# Patient Record
Sex: MALE | Race: WHITE | NOT HISPANIC OR LATINO | URBAN - METROPOLITAN AREA
[De-identification: names, ages, dates, MRNs, and addresses within clinical notes are randomized per-mention and may not be internally consistent; named-entity substitution may affect disease eponyms.]

---

## 2017-06-05 ENCOUNTER — IMPORTED ENCOUNTER (OUTPATIENT)
Dept: URBAN - METROPOLITAN AREA CLINIC 31 | Facility: CLINIC | Age: 72
End: 2017-06-05

## 2017-06-05 PROBLEM — Z96.1: Noted: 2017-06-05

## 2017-06-05 PROBLEM — H25.12: Noted: 2017-06-05

## 2017-06-05 PROBLEM — H43.811: Noted: 2017-06-05

## 2017-06-05 PROCEDURE — 92014 COMPRE OPH EXAM EST PT 1/>: CPT

## 2017-06-05 NOTE — PATIENT DISCUSSION
1.  Pseudophakia OD - IOL stable. Monitor. 2. Nuclear Sclerotic Cataract OS: Explained how cataracts can effect vision. Recommend clinical observation. The patient was advised to contact us if any change or worsening of vision. 3. PVD OD: Patient was cautioned to call our office immediately if they experience a substantial change in their symptoms such as an increase in floaters persistent flashes loss of visual field (may appear as a shadow or a curtain) or decrease in visual acuity as these may indicate a retinal tear or detachment. If this is a new problem patient will need to return for re-examination  as determined by the [facility] [place] [id number]. Return for an appointment in 1 year for comprehensive exam. with Dr. Dara Coy.

## 2018-06-04 ENCOUNTER — IMPORTED ENCOUNTER (OUTPATIENT)
Dept: URBAN - METROPOLITAN AREA CLINIC 31 | Facility: CLINIC | Age: 73
End: 2018-06-04

## 2018-06-04 PROBLEM — H26.491: Noted: 2018-06-04

## 2018-06-04 PROBLEM — H25.12: Noted: 2018-06-04

## 2018-06-04 PROBLEM — H43.811: Noted: 2018-06-04

## 2018-06-04 PROBLEM — Z96.1: Noted: 2018-06-04

## 2018-06-04 PROCEDURE — 92014 COMPRE OPH EXAM EST PT 1/>: CPT

## 2018-06-04 NOTE — PATIENT DISCUSSION
Nuclear Sclerotic Cataract OS: Discussed the risks benefits alternatives and limitations of cataract surgery including infection bleeding loss of vision retinal tears detachment. The patient stated a full understanding and a desire to proceed with the procedure in the left eye. Refractive options were reviewed. Patient has elected to be optimized for distance vision in the left eye. The patient will still need glasses for reading and to possibly fine tune distance vision.

## 2018-08-15 ENCOUNTER — IMPORTED ENCOUNTER (OUTPATIENT)
Dept: URBAN - METROPOLITAN AREA CLINIC 31 | Facility: CLINIC | Age: 73
End: 2018-08-15

## 2018-08-15 PROBLEM — H25.12: Noted: 2018-08-15

## 2018-08-15 PROBLEM — Z96.1: Noted: 2018-08-15

## 2018-08-15 PROBLEM — H26.491: Noted: 2018-08-15

## 2018-08-15 PROCEDURE — 92015 DETERMINE REFRACTIVE STATE: CPT

## 2018-08-15 PROCEDURE — 99214 OFFICE O/P EST MOD 30 MIN: CPT

## 2018-08-15 NOTE — PATIENT DISCUSSION
1.  PCO  OD (Posterior Capsule Opacification)   PCO is visually significant and impairment of vision does not meet the patient’s functional needs or interferes with activities of daily living. Risks benefits and alternatives to the Nd:YAG Laser reviewed including elevated IOP immediately postop and retinal tear/detachment. Patient to notify their ophthalmologist promptly if they have a significant change in symptoms such as flashes of light (photopsia) an increase in floaters loss of visual field or decrease in visual acuity after the procedure. Patient will be scheduled in [place] [address] for Nd:YAG Laser. 2. Cataract OS: Discussed the risks benefits alternatives and limitations of cataract surgery including infection bleeding loss of vision retinal tears detachment. The patient stated a full understanding and a desire to proceed with the surgery in the left eye. Refractive options were reviewed. Patient has elected to have monovision and has tried monovision in the past.  The patient will have ORA guidance to confirm lens power choice. There is no guarantee of perfect uncorrected acuity and the possible need for enhancement was discussed. The patient may need glasses for night driving and to fine tune the vision. Pore plano3. Pseudophakia OD - IOL stable. Monitor. 4. Return for an appointment for [address] [place] [address] with Dr. Gwendolyn Munoz.

## 2018-08-15 NOTE — PATIENT DISCUSSION
Cataract OS: Discussed the risks benefits alternatives and limitations of cataract surgery including infection bleeding loss of vision retinal tears detachment. The patient stated a full understanding and a desire to proceed with the surgery in the left eye. Refractive options were reviewed. Patient has elected to have monovision and has tried monovision in the past.  The patient will have ORA guidance to confirm lens power choice. There is no guarantee of perfect uncorrected acuity and the possible need for enhancement was discussed. The patient may need glasses for night driving and to fine tune the vision.  Pore plano

## 2018-09-19 ENCOUNTER — IMPORTED ENCOUNTER (OUTPATIENT)
Dept: URBAN - METROPOLITAN AREA CLINIC 31 | Facility: CLINIC | Age: 73
End: 2018-09-19

## 2018-09-19 PROBLEM — H25.812: Noted: 2018-09-19

## 2018-09-19 PROCEDURE — 76519 ECHO EXAM OF EYE: CPT

## 2018-09-19 NOTE — PATIENT DISCUSSION
Combined Types of Cataract OS: Discussed the risks benefits alternatives and limitations of cataract surgery including infection bleeding loss of vision retinal tears detachment. Refractive options were reviewed. The patient stated a full understanding and a desire to proceed with the procedure in the left eye. Patient has elected to be optimized for distance vision in the left eye. The patient will still need glasses for reading and to possibly fine tune distance vision.

## 2018-10-02 ENCOUNTER — IMPORTED ENCOUNTER (OUTPATIENT)
Dept: URBAN - METROPOLITAN AREA CLINIC 31 | Facility: CLINIC | Age: 73
End: 2018-10-02

## 2018-10-02 PROBLEM — Z96.1: Noted: 2018-10-02

## 2018-10-02 PROCEDURE — 99024 POSTOP FOLLOW-UP VISIT: CPT

## 2018-10-02 NOTE — PATIENT DISCUSSION
1.  Post-Op Day #1 - Cataract Surgery Left Eye (OS) - doing well. Tears prn. Continue postop drops as directed. Call office with symptoms of pain redness or decreased vision in operative eye.  1 drop of zylet instilled2. Return for an appointment in 1 week for post op exam. with Dr. Tino Gan.

## 2018-10-10 ENCOUNTER — IMPORTED ENCOUNTER (OUTPATIENT)
Dept: URBAN - METROPOLITAN AREA CLINIC 31 | Facility: CLINIC | Age: 73
End: 2018-10-10

## 2018-10-10 PROBLEM — Z96.1: Noted: 2018-10-10

## 2018-10-10 PROCEDURE — 99024 POSTOP FOLLOW-UP VISIT: CPT

## 2018-10-10 NOTE — PATIENT DISCUSSION
1.  Post-Op Week #1 - Cataract Surgery Left Eye (OS) -  Intraocular lens stable and surgery very well healed. Patient to resume all normal activities. Finish postop drops as directed. Final Refraction given if necessary. Call with any problems. 2. Return for an appointment in 4 months for dilated fundus exam. with Dr. Dara Coy.

## 2019-03-04 ENCOUNTER — IMPORTED ENCOUNTER (OUTPATIENT)
Dept: URBAN - METROPOLITAN AREA CLINIC 31 | Facility: CLINIC | Age: 74
End: 2019-03-04

## 2019-03-04 PROBLEM — Z96.1: Noted: 2019-03-04

## 2019-03-04 PROBLEM — H43.811: Noted: 2019-03-04

## 2019-03-04 PROBLEM — H26.491: Noted: 2019-03-04

## 2019-03-04 PROCEDURE — 99214 OFFICE O/P EST MOD 30 MIN: CPT

## 2019-03-04 NOTE — PATIENT DISCUSSION
1.  Pseudophakia OU - IOLs stable. Monitor. 2. PVD OD: Patient was cautioned to call our office immediately if they experience a substantial change in their symptoms such as an increase in floaters persistent flashes loss of visual field (may appear as a shadow or a curtain) or decrease in visual acuity as these may indicate a retinal tear or detachment. If this is a new problem patient will need to return for re-examination  as determined by the [address]. Return for an appointment in 1 year for comprehensive exam. with Dr. Mer Zimmer.

## 2019-03-04 NOTE — PATIENT DISCUSSION
1.  PCO  OD (Posterior Capsule Opacification)  Not visually significant at this time. Monitor for yag capsulotomy necessity. 2. Pseudophakia OU - IOLs stable. Monitor. 3. PVD OD: Patient was cautioned to call our office immediately if they experience a substantial change in their symptoms such as an increase in floaters persistent flashes loss of visual field (may appear as a shadow or a curtain) or decrease in visual acuity as these may indicate a retinal tear or detachment. If this is a new problem patient will need to return for re-examination  as determined by the [address] [place] [address]. Return for an appointment in 1 year for comprehensive exam. with Dr. Eulalia Gregorio.

## 2020-03-23 ENCOUNTER — IMPORTED ENCOUNTER (OUTPATIENT)
Dept: URBAN - METROPOLITAN AREA CLINIC 31 | Facility: CLINIC | Age: 75
End: 2020-03-23

## 2020-03-23 PROBLEM — Z96.1: Noted: 2020-03-23

## 2020-03-23 PROBLEM — H26.491: Noted: 2020-03-23

## 2020-03-23 PROCEDURE — 92014 COMPRE OPH EXAM EST PT 1/>: CPT

## 2020-03-23 NOTE — PATIENT DISCUSSION
1.  Pseudophakia OU - IOLs stable. Monitor for changes in vision. 2. PCO  OD (Posterior Capsule Opacification)  Not visually significant at this time. Monitor for yag capsulotomy necessity. 3. Return for an appointment in 1 year for comprehensive exam. with Dr. Victoria Donald.

## 2021-04-15 ENCOUNTER — IMPORTED ENCOUNTER (OUTPATIENT)
Dept: URBAN - METROPOLITAN AREA CLINIC 31 | Facility: CLINIC | Age: 76
End: 2021-04-15

## 2021-04-15 PROBLEM — Z96.1: Noted: 2021-04-15

## 2021-04-15 PROCEDURE — 99214 OFFICE O/P EST MOD 30 MIN: CPT

## 2022-04-01 ASSESSMENT — VISUAL ACUITY
OS_CC: 20/20
OS_CC: 20/25-2
OD_SC: 20/4016''
OD_CC: 20/60
OU_SC: 20/20
OU_SC: 20/25
OD_CC: 20/100
OD_CC: 20/50
OD_SC: 20/20
OS_CC: 20/20-2
OD_SC: 20/25
OD_PH: SC 20/30
OD_SC: 20/20
OD_PH: SC 20/25
OD_SC: 20/20-1
OD_SC: 20/25
OS_CC: 20/30-2
OD_SC: 20/25-2
OS_GLARE: 20/50
OU_CC: 20/20
OD_CC: 20/400
OS_CC: 20/25-2
OS_CC: 20/40
OS_CC: 20/25
OD_SC: J2
OS_CC: 20/25-1

## 2022-04-01 ASSESSMENT — TONOMETRY
OD_IOP_MMHG: 18
OD_IOP_MMHG: 14
OS_IOP_MMHG: 15
OD_IOP_MMHG: 14
OS_IOP_MMHG: 20
OS_IOP_MMHG: 23
OD_IOP_MMHG: 14
OD_IOP_MMHG: 15
OS_IOP_MMHG: 15
OD_IOP_MMHG: 17
OD_IOP_MMHG: 17
OS_IOP_MMHG: 22
OD_IOP_MMHG: 15
OS_IOP_MMHG: 20
OS_IOP_MMHG: 14
OS_IOP_MMHG: 21

## 2022-04-18 ENCOUNTER — COMPREHENSIVE EXAM (OUTPATIENT)
Dept: URBAN - METROPOLITAN AREA CLINIC 29 | Facility: CLINIC | Age: 77
End: 2022-04-18

## 2022-04-18 DIAGNOSIS — Z96.1: ICD-10-CM

## 2022-04-18 PROCEDURE — 99214 OFFICE O/P EST MOD 30 MIN: CPT

## 2022-04-18 ASSESSMENT — VISUAL ACUITY
OS_SC: 20/30 -2
OD_SC: J1
OD_SC: 20/50

## 2022-04-18 ASSESSMENT — TONOMETRY
OS_IOP_MMHG: 18
OD_IOP_MMHG: 17

## 2023-04-24 ENCOUNTER — COMPREHENSIVE EXAM (OUTPATIENT)
Dept: URBAN - METROPOLITAN AREA CLINIC 29 | Facility: CLINIC | Age: 78
End: 2023-04-24

## 2023-04-24 DIAGNOSIS — H52.203: ICD-10-CM

## 2023-04-24 DIAGNOSIS — H52.11: ICD-10-CM

## 2023-04-24 DIAGNOSIS — Z96.1: ICD-10-CM

## 2023-04-24 PROCEDURE — 99214 OFFICE O/P EST MOD 30 MIN: CPT

## 2023-04-24 PROCEDURE — 92015 DETERMINE REFRACTIVE STATE: CPT

## 2023-04-24 ASSESSMENT — VISUAL ACUITY
OD_SC: 20/20
OS_SC: 20/30
OD_SC: 20/40
OS_SC: 20/25-2

## 2023-04-24 ASSESSMENT — TONOMETRY
OD_IOP_MMHG: 14
OS_IOP_MMHG: 14

## 2024-03-13 NOTE — PATIENT DISCUSSION
1.  PVD OD: Patient was cautioned to call our office immediately if they experience a substantial change in their symptoms such as an increase in floaters persistent flashes loss of visual field (may appear as a shadow or a curtain) or decrease in visual acuity as these may indicate a retinal tear or detachment. If this is a new problem patient will need to return for re-examination  as determined by the [address] Pseudophakia OD - IOL stable. 3.  Nuclear Sclerotic Cataract OS: Discussed the risks benefits alternatives and limitations of cataract surgery including infection bleeding loss of vision retinal tears detachment. The patient stated a full understanding and a desire to proceed with the procedure in the left eye. Refractive options were reviewed. Patient has elected to be optimized for distance vision in the left eye. The patient will still need glasses for reading and to possibly fine tune distance vision. 4. PCO  OD (Posterior Capsule Opacification)  Not visually significant at this time. Monitor for yag capsulotomy necessity. 5. Return for an appointment in 1 month for cataract evaluation. with Dr. Kessler Laseamus. Patient due for Colonoscopy. Reminder mailed today 03/13/24 .

## 2024-04-29 ENCOUNTER — COMPREHENSIVE EXAM (OUTPATIENT)
Dept: URBAN - METROPOLITAN AREA CLINIC 29 | Facility: CLINIC | Age: 79
End: 2024-04-29

## 2024-04-29 DIAGNOSIS — Z96.1: ICD-10-CM

## 2024-04-29 PROCEDURE — 92014 COMPRE OPH EXAM EST PT 1/>: CPT

## 2024-04-29 ASSESSMENT — VISUAL ACUITY
OD_SC: 20/25
OD_PH: 20/25-1
OS_SC: 20/100
OD_SC: 20/40-1
OS_SC: 20/25-2

## 2024-04-29 ASSESSMENT — TONOMETRY
OS_IOP_MMHG: 14
OD_IOP_MMHG: 14

## 2025-05-05 ENCOUNTER — COMPREHENSIVE EXAM (OUTPATIENT)
Age: 80
End: 2025-05-05

## 2025-05-05 DIAGNOSIS — Z96.1: ICD-10-CM

## 2025-05-05 DIAGNOSIS — H35.372: ICD-10-CM

## 2025-05-05 PROCEDURE — 99214 OFFICE O/P EST MOD 30 MIN: CPT
